# Patient Record
Sex: FEMALE
[De-identification: names, ages, dates, MRNs, and addresses within clinical notes are randomized per-mention and may not be internally consistent; named-entity substitution may affect disease eponyms.]

---

## 2024-09-12 PROBLEM — Z00.00 ENCOUNTER FOR PREVENTIVE HEALTH EXAMINATION: Status: ACTIVE | Noted: 2024-09-12

## 2024-09-13 PROBLEM — Z78.9 NON-SMOKER: Status: ACTIVE | Noted: 2024-09-13

## 2024-09-13 PROBLEM — F64.0 GENDER DYSPHORIA IN ADULT: Status: ACTIVE | Noted: 2024-09-13

## 2024-09-13 PROBLEM — Z87.09 HISTORY OF ASTHMA: Status: RESOLVED | Noted: 2024-09-13 | Resolved: 2024-09-13

## 2024-09-13 PROBLEM — Z86.59 HISTORY OF ATTENTION DEFICIT HYPERACTIVITY DISORDER (ADHD): Status: RESOLVED | Noted: 2024-09-13 | Resolved: 2024-09-13

## 2024-09-13 PROBLEM — F12.91 HISTORY OF MARIJUANA USE: Status: ACTIVE | Noted: 2024-09-13

## 2024-09-13 RX ORDER — PROGESTERONE 200 MG/1
CAPSULE ORAL
Refills: 0 | Status: ACTIVE | COMMUNITY

## 2024-09-13 RX ORDER — ESTRADIOL 10 UG/1
TABLET, FILM COATED VAGINAL
Refills: 0 | Status: ACTIVE | COMMUNITY

## 2024-09-13 RX ORDER — SPIRONOLACTONE 50 MG/1
TABLET ORAL
Refills: 0 | Status: ACTIVE | COMMUNITY

## 2024-09-13 RX ORDER — ALBUTEROL 90 MCG
90 AEROSOL (GRAM) INHALATION
Refills: 0 | Status: ACTIVE | COMMUNITY

## 2024-09-18 NOTE — HISTORY OF PRESENT ILLNESS
[FreeTextEntry1] : 27yo woman designated male at birth (Eleni; she/they) presents for consultation for vaginoplasty. She is primarily interested in vaginal receptive intercourse with a penis. She has been on feminizing hormones for 7 years. She tucks with tight underwear, no tape. She hasn't had sperm stored for fertility preservation and has no desire to do so. She expresses understanding that no additional sperm would be able to be retrievable following vaginoplasty and orchiectomy. She is capable of orgasm. She denies any hernias or masses. She is circumcised from birth and denies any other surgical procedures to the area. She has undergone about 3-4 session of laser hair removal on her genitals. She denies any history of DVT/PE. She also denies any history of issues with general anesthesia.  Patient works as a . Lives with supportive  who will be helpful after surgery. Endorses marijuana use. The patient agreed to avoid all marijuana smoking for 6 weeks before surgery and 6 weeks after surgery and to avoid all THC use for 2 weeks before and after surgery. Denies tobacco use, drinks some alcohol (at most 3 drinks/week) and denies any other recreational drug use. Patient denies any history of psychiatric hospitalization or ER admission.

## 2024-09-18 NOTE — ASSESSMENT
[FreeTextEntry1] : The patient is a reasonable candidate for a robot-assisted penile inversion vaginoplasty in a joint procedure with Dr. Quintanilla. She will need 2 letters of assessment prior to submitting for insurance authorization. She will also need to continue hair removal, resources provided. She will also need to meet with Dr. Stinson prior the procedure. We advised her to follow up in 3 months.  I, Dr. Pacheco, personally performed the evaluation and management (E/M) services for this new patient. That E/M includes conducting the clinically appropriate initial history &/or exam, assessing all conditions, and establishing the plan of care. Today, my YAZAN, Tiago Corey PA-C, was here to observe my evaluation and management service for this patient & follow plan of care established by me going forward.

## 2024-09-18 NOTE — PHYSICAL EXAM
[de-identified] : NAD. BMI [] [de-identified] : Normal respiratory effort. [de-identified] : Genital exam was performed with a medical chaperone present (DM). There are no palpable testicular masses present. There are no palpable hernias. There are no visible skin lesions. There appears to be [] available local tissue for this patient's gender goals.

## 2024-09-19 ENCOUNTER — APPOINTMENT (OUTPATIENT)
Dept: PLASTIC SURGERY | Facility: CLINIC | Age: 29
End: 2024-09-19

## 2024-09-19 DIAGNOSIS — Z86.59 PERSONAL HISTORY OF OTHER MENTAL AND BEHAVIORAL DISORDERS: ICD-10-CM

## 2024-09-19 DIAGNOSIS — Z87.09 PERSONAL HISTORY OF OTHER DISEASES OF THE RESPIRATORY SYSTEM: ICD-10-CM

## 2024-09-19 DIAGNOSIS — F64.0 TRANSSEXUALISM: ICD-10-CM

## 2024-09-19 DIAGNOSIS — F12.91 CANNABIS USE, UNSPECIFIED, IN REMISSION: ICD-10-CM

## 2024-09-19 DIAGNOSIS — Z78.9 OTHER SPECIFIED HEALTH STATUS: ICD-10-CM
